# Patient Record
Sex: MALE | Race: WHITE
[De-identification: names, ages, dates, MRNs, and addresses within clinical notes are randomized per-mention and may not be internally consistent; named-entity substitution may affect disease eponyms.]

---

## 2017-12-01 ENCOUNTER — HOSPITAL ENCOUNTER (OUTPATIENT)
Dept: HOSPITAL 47 - RADUSWWP | Age: 67
Discharge: HOME | End: 2017-12-01
Payer: MEDICARE

## 2017-12-01 DIAGNOSIS — Z13.9: Primary | ICD-10-CM

## 2017-12-01 PROCEDURE — 93979 VASCULAR STUDY: CPT

## 2017-12-01 NOTE — US
EXAMINATION TYPE: US duplex aorta

 

DATE OF EXAM: 12/1/2017

 

COMPARISON: NONE

 

CLINICAL HISTORY: Z13.9 abdominal aortic anuerysm screening.

 

EXAM MEASUREMENTS:

 

Abdominal Aorta:

** Proximal:  2.4 x 2.1cm

** Mid:  1.9 x 2.1cm

** Distal:  1.6 x 2.0cm

** Right Iliac:  1.1 x 1.3cm 

** Left Iliac:  1.3 x 1.3cm

 

Limited visualization due to patient body habitus and overlying midline bowel gas.

 

 

 

IMPRESSION: No evidence for abdominal aortic aneurysm.

## 2019-06-13 ENCOUNTER — HOSPITAL ENCOUNTER (OUTPATIENT)
Dept: HOSPITAL 47 - ORWHC2ENDO | Age: 69
Discharge: HOME | End: 2019-06-13
Payer: MEDICARE

## 2019-06-13 VITALS — HEART RATE: 76 BPM | SYSTOLIC BLOOD PRESSURE: 108 MMHG | DIASTOLIC BLOOD PRESSURE: 76 MMHG

## 2019-06-13 VITALS — BODY MASS INDEX: 32.5 KG/M2

## 2019-06-13 VITALS — TEMPERATURE: 97.4 F

## 2019-06-13 VITALS — RESPIRATION RATE: 16 BRPM

## 2019-06-13 DIAGNOSIS — Z89.022: ICD-10-CM

## 2019-06-13 DIAGNOSIS — F17.200: ICD-10-CM

## 2019-06-13 DIAGNOSIS — K64.4: ICD-10-CM

## 2019-06-13 DIAGNOSIS — K52.9: ICD-10-CM

## 2019-06-13 DIAGNOSIS — Z91.040: ICD-10-CM

## 2019-06-13 DIAGNOSIS — Z79.899: ICD-10-CM

## 2019-06-13 DIAGNOSIS — Z79.82: ICD-10-CM

## 2019-06-13 DIAGNOSIS — D12.5: Primary | ICD-10-CM

## 2019-06-13 DIAGNOSIS — K64.8: ICD-10-CM

## 2019-06-13 PROCEDURE — 45380 COLONOSCOPY AND BIOPSY: CPT

## 2019-06-13 PROCEDURE — 88305 TISSUE EXAM BY PATHOLOGIST: CPT

## 2019-06-13 NOTE — P.PCN
Date of Procedure: 06/13/19


Description of Procedure: 





BRIEF HISTORY: 


Patient is a 68-year-old pleasant female patient who was seen in the office 

reporting chronic diarrhea since 01/07/2019.  His symptoms started suddenly last

bowel movement today.  He reported extreme urgency and fecal incontinence with 

the episode.  No nocturnal bowel movements or blood per rectum.  There does not 

seem to be diet related.  He reports losing 35 pounds in 1-1/2 years.  Recently 

he has gained back 15 pounds.  No prior colonoscopy.  Stool studies were 

unremarkable.  





PROCEDURE PERFORMED: 


Colonoscopy with polypectomy and random biopsy. 





PREOPERATIVE DIAGNOSIS: 


Change in bowel habits, diarrhea, no prior colonoscopy. 





ESTIMATED BLOOD LOSS: 


Minimal.





IV sedation per Anesthesia. 





PROCEDURE: 


After informed consent was obtained, the patient, was brought into the endoscopy

unit. IV sedation was administered by Anesthesia under continuous monitoring.  

Digital rectal examination was normal. Initially the Olympus CF-190 flexible 

video colonoscope was then inserted in the rectum, gradually advanced into the 

cecum without any difficulty.  The terminal ileum was intubated and appeared 

normal.  Careful examination was performed as the scope was gradually being 

withdrawn. Ileocecal valve and the appendiceal orifice were visualized and 

appeared normal.  Prep was excellent. Mucosa of the cecum, ascending colon, 

transverse colon, descending colon, sigmoid colon, and rectum appeared normal, 

with random biopsies taken of the right colon, transverse colon and left colon. 

Moderate internal hemorrhoids with skin tags noted.. Retroflexion was performed 

in the rectum and no lesions were seen. The patient tolerated the procedure 

well. 





IMPRESSION: 


Normal-appearing colon from rectum to cecum with random biopsies of the right 

colon, transverse colon and left colon.


Normal-appearing terminal ileum.


Moderate internal hemorrhoids.





RECOMMENDATIONS:  


Findings of this examination were discussed with the patient.  Okay to resume 

diet.  Await pathology from biopsies.  Follow up with gastroenterology as 

previously scheduled.  Continue Lomotil as needed for diarrhea.

## 2019-12-20 ENCOUNTER — HOSPITAL ENCOUNTER (OUTPATIENT)
Dept: HOSPITAL 47 - RADPROMAIN | Age: 69
Discharge: HOME | End: 2019-12-20
Attending: OTOLARYNGOLOGY
Payer: MEDICARE

## 2019-12-20 VITALS — TEMPERATURE: 98 F | RESPIRATION RATE: 20 BRPM

## 2019-12-20 VITALS — HEART RATE: 74 BPM | DIASTOLIC BLOOD PRESSURE: 80 MMHG | SYSTOLIC BLOOD PRESSURE: 142 MMHG

## 2019-12-20 DIAGNOSIS — R59.0: Primary | ICD-10-CM

## 2019-12-20 DIAGNOSIS — D11.9: ICD-10-CM

## 2019-12-20 PROCEDURE — 88305 TISSUE EXAM BY PATHOLOGIST: CPT

## 2019-12-20 PROCEDURE — 38505 NEEDLE BIOPSY LYMPH NODES: CPT

## 2019-12-20 PROCEDURE — 21550 BIOPSY OF NECK/CHEST: CPT

## 2019-12-20 NOTE — US
EXAMINATION TYPE: US biopsy thorax or neck

 

DATE OF EXAM: 12/20/2019

 

HISTORY: Left neck adenopathy.

 

FINDINGS: Maximal barrier technique was utilized.  The skin overlying a suitable path to the patient'
s left neck enlarged node was localized with ultrasound and the overlying skin prepped and draped.  U
ltrasound was utilized with sterile technique. Lidocaine was  used for local anesthesia.  A skin nick
 was made with a scalpel.  An 18-gauge needle was advanced under direct ultrasound guidance and core 
specimen obtained of the mass, second core obtained.  Specimen submitted in saline to Pathology.  Fol
lowing the procedure, hemostasis achieved and the patient is discharged in stable condition without c
omplication.

 

IMPRESSION:STATUS POST ULTRASOUND GUIDED CORE BIOPSY OF left neck adenopathy, PATHOLOGY IS PENDING.  
THIS PROCEDURE IS PERFORMED BY THE UNDERSIGNED.

## 2019-12-27 ENCOUNTER — HOSPITAL ENCOUNTER (OUTPATIENT)
Dept: HOSPITAL 47 - RADCTMAIN | Age: 69
Discharge: HOME | End: 2019-12-27
Attending: OTOLARYNGOLOGY
Payer: MEDICARE

## 2019-12-27 DIAGNOSIS — R22.1: Primary | ICD-10-CM

## 2019-12-27 LAB — BUN SERPL-SCNC: 25 MG/DL (ref 9–20)

## 2019-12-27 PROCEDURE — 82565 ASSAY OF CREATININE: CPT

## 2019-12-27 PROCEDURE — 36415 COLL VENOUS BLD VENIPUNCTURE: CPT

## 2019-12-27 PROCEDURE — 70491 CT SOFT TISSUE NECK W/DYE: CPT

## 2019-12-27 PROCEDURE — 84520 ASSAY OF UREA NITROGEN: CPT

## 2019-12-27 NOTE — CT
EXAMINATION TYPE: CT soft tissue neck w con

 

DATE OF EXAM: 12/27/2019

 

COMPARISON: None

 

HISTORY: Lump left side neck

 

CT DLP: 747.7 mGycm

 

CONTRAST: 

CT scan of the neck is performed with IV Contrast, patient injected with 80 mL of Isovue 300.

 

Contrast enhanced CT of the neck was performed from the skull base through the lung apices.

 

AIRWAY:   The supraglottic, glottic, and subglottic portions of the airway appear patent and free of 
mass.

 

SALIVARY GLANDS:  The submandibular and parotid glands are free of mass or inflammatory process.

 

THYROID GLAND:  No nodules or masses seen.

 

LYMPH NODES: There is a solid left neck mass resides adjacent to the lower pole of the left parotid g
land which measures 3.8 x 2.2 x 2.7 cm. The mass appears to be separate from the parotid gland althou
gh it doesn't impart mass effect upon its lower pole. No additional solid soft tissue mass is seen.

 

LUNG APICES:  No nodule or mass is seen.

 

OTHER:  Vascular structures are patent.  No significant degenerative change of the cervical spine.  N
o abscess seen.

 

IMPRESSION:

Solid left neck mass residing adjacent to the lower pole of the left parotid gland as discussed above
. Tissue diagnosis has been performed and correlated with pathologic results. No additional adenopath
y or mass is identified at this time.

## 2020-01-22 ENCOUNTER — HOSPITAL ENCOUNTER (OUTPATIENT)
Dept: HOSPITAL 47 - OR | Age: 70
Discharge: HOME | End: 2020-01-22
Attending: OTOLARYNGOLOGY
Payer: MEDICARE

## 2020-01-22 VITALS — RESPIRATION RATE: 16 BRPM

## 2020-01-22 VITALS — TEMPERATURE: 97.2 F

## 2020-01-22 VITALS — SYSTOLIC BLOOD PRESSURE: 118 MMHG | HEART RATE: 71 BPM | DIASTOLIC BLOOD PRESSURE: 72 MMHG

## 2020-01-22 VITALS — BODY MASS INDEX: 30.4 KG/M2

## 2020-01-22 DIAGNOSIS — Z79.899: ICD-10-CM

## 2020-01-22 DIAGNOSIS — Z98.890: ICD-10-CM

## 2020-01-22 DIAGNOSIS — K21.9: ICD-10-CM

## 2020-01-22 DIAGNOSIS — Z91.040: ICD-10-CM

## 2020-01-22 DIAGNOSIS — F17.210: ICD-10-CM

## 2020-01-22 DIAGNOSIS — Z89.9: ICD-10-CM

## 2020-01-22 DIAGNOSIS — Z79.82: ICD-10-CM

## 2020-01-22 DIAGNOSIS — K58.9: ICD-10-CM

## 2020-01-22 DIAGNOSIS — Z82.61: ICD-10-CM

## 2020-01-22 DIAGNOSIS — D11.0: Primary | ICD-10-CM

## 2020-01-22 DIAGNOSIS — I10: ICD-10-CM

## 2020-01-22 PROCEDURE — 42415 EXCISE PAROTID GLAND/LESION: CPT

## 2020-01-22 PROCEDURE — 88307 TISSUE EXAM BY PATHOLOGIST: CPT

## 2020-01-22 NOTE — P.OP
Date of Procedure: 01/22/20


Preoperative Diagnosis: 


Left parotid neoplasm


Postoperative Diagnosis: 


Same


Procedure(s) Performed: 


Left superficial parotidectomy with facial nerve dissection and preservation


EMG facial nerve monitoring


Anesthesia: TAMYA


Surgeon: Raimundo Domínguez


Estimated Blood Loss (ml): 5


Pathology: other (Left parotid neoplasm)


Condition: stable


Disposition: PACU


Indications for Procedure: 


This 69-year-old white male who developed a left neck mass several months ago.  

This is the upper neck and has had computed tomography scan which showed this 

arising from the inferior aspect of the left parotid gland.  Needle biopsy was 

consistent with a Warthin's tumor.


Operative Findings: 


Approximate 3.5 x 4 cm left inferior parotid mass which was well circumscribed 

and appeared partially cystic.  This arose from the inferior aspect of the left 

parotid gland.  Marginal mandibular branch of facial nerve was identified and 

preserved as well as the greater auricular nerve.  This was excised grossly 

entirely.


Description of Procedure: 


Patient brought In the supine position.  Patient underwent induction of general 

anesthesia with oral endotracheal intubation without difficulty.  The patient 

was prepped and draped in usual aseptic fashion.  Facial nerve monitor was 

placed which was the NIM II and was tested manually and working well.  1% 

lidocaine with 100,000 epinephrine was infused subcutaneously along the 

transverse incision site left neck.  A transverse incision was made to half 

fingerbreadths below the angle of mandible on the left upper neck within a 

relaxed skin tension line.  This is carried sharply through skin and subcutaneo

us tissue and platysma layer.  Subplatysmal flaps were raised superiorly and 

inferiorly.  The mass itself was identified anterior to sternocleidomastoid 

muscle and was adjacent to the greater auricular nerve which was dissected free 

from the mass and left intact.  The dissection continued at the superior aspect 

of the lesion down to the marginal mandibular branch of facial nerve which was 

identified and preserved and the mass was dissected from this from superior to 

inferior.  Mass was dissected off of the inferior aspect of the parotid gland 

small cuff of normal parotid tissue attached and was then dissected from the 

surrounding tissue otherwise cleaned off of the sternocleidomastoid muscle 

inferiorly.  Once the mass was excised hemostasis was gained with the 

Hemostatix.  Throughout the case larger vessels were doubly clamped divided and 

tied with 3-0 Vicryl ties.  On completion of the removal of this lesion the 

marginal mandibular branch of the facial nerve was noted to be grossly intact 

and stimulated well at 0.5 mA.  The there was good hemostasis noted and copious 

irrigation with sterile normal saline was performed.  A small 10-Slov[place] round 

drain was placed through a separate stab incision and sutured to the skin with a

2-0 silk suture.  The wound was then closed in the platysma and subcutaneous 

layers with inverted interrupted 4-0 Vicryl suture and skin closed with running 

locking 5-0 Prolene suture.  Bacitracin ointment sterile dressing were placed as

well as drain dressing.  The drain is working well with minimal output 

depression 1 mL.  Patient tolerated procedure well and was extubated in the 

operating suite and transferred to postop recovery area in satisfactory 

condition.

## 2025-04-29 ENCOUNTER — HOSPITAL ENCOUNTER (OUTPATIENT)
Dept: HOSPITAL 47 - EC | Age: 75
Setting detail: OBSERVATION
LOS: 1 days | Discharge: HOME | End: 2025-04-30
Attending: INTERNAL MEDICINE | Admitting: INTERNAL MEDICINE
Payer: MEDICARE

## 2025-04-29 VITALS — BODY MASS INDEX: 30.1 KG/M2

## 2025-04-29 DIAGNOSIS — I12.9: ICD-10-CM

## 2025-04-29 DIAGNOSIS — D72.829: ICD-10-CM

## 2025-04-29 DIAGNOSIS — E66.9: ICD-10-CM

## 2025-04-29 DIAGNOSIS — R00.1: ICD-10-CM

## 2025-04-29 DIAGNOSIS — N40.0: ICD-10-CM

## 2025-04-29 DIAGNOSIS — M48.00: ICD-10-CM

## 2025-04-29 DIAGNOSIS — N18.30: ICD-10-CM

## 2025-04-29 DIAGNOSIS — Z98.890: ICD-10-CM

## 2025-04-29 DIAGNOSIS — Z79.899: ICD-10-CM

## 2025-04-29 DIAGNOSIS — Z79.82: ICD-10-CM

## 2025-04-29 DIAGNOSIS — E87.20: ICD-10-CM

## 2025-04-29 DIAGNOSIS — I48.91: Primary | ICD-10-CM

## 2025-04-29 DIAGNOSIS — Z91.040: ICD-10-CM

## 2025-04-29 DIAGNOSIS — Z86.018: ICD-10-CM

## 2025-04-29 DIAGNOSIS — I21.A1: ICD-10-CM

## 2025-04-29 DIAGNOSIS — I07.1: ICD-10-CM

## 2025-04-29 LAB
ALBUMIN SERPL-MCNC: 3.5 G/DL (ref 3.5–5)
ALP SERPL-CCNC: 94 U/L (ref 38–126)
ALT SERPL-CCNC: 12 U/L (ref 4–49)
ANION GAP SERPL CALC-SCNC: 7 MMOL/L
APTT BLD: 24.1 SEC (ref 22–30)
AST SERPL-CCNC: 14 U/L (ref 17–59)
BASOPHILS # BLD AUTO: 0.06 10*3/UL (ref 0–0.1)
BASOPHILS NFR BLD AUTO: 0.6 %
BUN SERPL-SCNC: 36 MG/DL (ref 9–20)
CHLORIDE SERPL-SCNC: 104 MMOL/L (ref 98–107)
CO2 SERPL-SCNC: 22 MMOL/L (ref 22–30)
EOSINOPHIL # BLD AUTO: 0.06 10*3/UL (ref 0.04–0.35)
EOSINOPHIL NFR BLD AUTO: 0.6 %
ERYTHROCYTE [DISTWIDTH] IN BLOOD BY AUTOMATED COUNT: 5.03 10*6/UL (ref 4.4–5.6)
GLUCOSE SERPL-MCNC: 122 MG/DL (ref 74–99)
HGB BLD-MCNC: 16.7 G/DL (ref 13–17)
LYMPHOCYTES # SPEC AUTO: 1.39 10*3/UL (ref 0.9–5)
LYMPHOCYTES NFR SPEC AUTO: 14.9 %
MAGNESIUM SPEC-SCNC: 1.7 MG/DL (ref 1.6–2.3)
MCH RBC QN AUTO: 33.2 PG (ref 27–32)
MCHC RBC AUTO-ENTMCNC: 35.5 G/DL (ref 32–37)
MCV RBC AUTO: 93.4 FL (ref 80–97)
MONOCYTES # BLD AUTO: 0.86 10*3/UL (ref 0.2–1)
MONOCYTES NFR BLD AUTO: 9.2 %
NEUTROPHILS # BLD AUTO: 6.96 10*3/UL (ref 1.8–7.7)
NEUTROPHILS NFR BLD AUTO: 74.5 %
NT-PROBNP SERPL-MCNC: 246 PG/ML
PLATELET # BLD AUTO: 257 10*3/UL (ref 140–440)
PROT SERPL-MCNC: 5.9 G/DL (ref 6.3–8.2)
PT BLD: 11.3 SEC (ref 10–12.5)
SODIUM SERPL-SCNC: 133 MMOL/L (ref 137–145)
WBC # BLD AUTO: 9.35 10*3/UL (ref 4.5–10)

## 2025-04-29 PROCEDURE — 96368 THER/DIAG CONCURRENT INF: CPT

## 2025-04-29 PROCEDURE — 93005 ELECTROCARDIOGRAM TRACING: CPT

## 2025-04-29 PROCEDURE — 93306 TTE W/DOPPLER COMPLETE: CPT

## 2025-04-29 PROCEDURE — 85730 THROMBOPLASTIN TIME PARTIAL: CPT

## 2025-04-29 PROCEDURE — 96376 TX/PRO/DX INJ SAME DRUG ADON: CPT

## 2025-04-29 PROCEDURE — 36415 COLL VENOUS BLD VENIPUNCTURE: CPT

## 2025-04-29 PROCEDURE — 83735 ASSAY OF MAGNESIUM: CPT

## 2025-04-29 PROCEDURE — 84443 ASSAY THYROID STIM HORMONE: CPT

## 2025-04-29 PROCEDURE — 96365 THER/PROPH/DIAG IV INF INIT: CPT

## 2025-04-29 PROCEDURE — 96366 THER/PROPH/DIAG IV INF ADDON: CPT

## 2025-04-29 PROCEDURE — 83605 ASSAY OF LACTIC ACID: CPT

## 2025-04-29 PROCEDURE — 85379 FIBRIN DEGRADATION QUANT: CPT

## 2025-04-29 PROCEDURE — 80053 COMPREHEN METABOLIC PANEL: CPT

## 2025-04-29 PROCEDURE — 99291 CRITICAL CARE FIRST HOUR: CPT

## 2025-04-29 PROCEDURE — 84100 ASSAY OF PHOSPHORUS: CPT

## 2025-04-29 PROCEDURE — 83880 ASSAY OF NATRIURETIC PEPTIDE: CPT

## 2025-04-29 PROCEDURE — 84484 ASSAY OF TROPONIN QUANT: CPT

## 2025-04-29 PROCEDURE — 85025 COMPLETE CBC W/AUTO DIFF WBC: CPT

## 2025-04-29 PROCEDURE — 85610 PROTHROMBIN TIME: CPT

## 2025-04-29 RX ADMIN — CEFAZOLIN ONE MLS/HR: 330 INJECTION, POWDER, FOR SOLUTION INTRAMUSCULAR; INTRAVENOUS at 17:39

## 2025-04-29 RX ADMIN — DILTIAZEM HYDROCHLORIDE STA MG: 5 INJECTION INTRAVENOUS at 17:48

## 2025-04-29 RX ADMIN — HEPARIN SODIUM ONE UNIT: 1000 INJECTION, SOLUTION INTRAVENOUS; SUBCUTANEOUS at 18:49

## 2025-04-29 RX ADMIN — DILTIAZEM HYDROCHLORIDE SCH MLS/HR: 5 INJECTION INTRAVENOUS at 17:39

## 2025-04-29 RX ADMIN — HEPARIN SODIUM SCH MLS/HR: 10000 INJECTION, SOLUTION INTRAVENOUS at 18:45

## 2025-04-29 RX ADMIN — CEFAZOLIN SCH MLS/HR: 330 INJECTION, POWDER, FOR SOLUTION INTRAMUSCULAR; INTRAVENOUS at 18:51

## 2025-04-30 VITALS — SYSTOLIC BLOOD PRESSURE: 126 MMHG | DIASTOLIC BLOOD PRESSURE: 78 MMHG | TEMPERATURE: 97.8 F | HEART RATE: 63 BPM

## 2025-04-30 VITALS — RESPIRATION RATE: 16 BRPM

## 2025-04-30 LAB
ALBUMIN SERPL-MCNC: 4.4 G/DL (ref 3.5–5)
ALP SERPL-CCNC: 127 U/L (ref 38–126)
ALT SERPL-CCNC: 15 U/L (ref 4–49)
ANION GAP SERPL CALC-SCNC: 15 MMOL/L
AST SERPL-CCNC: 19 U/L (ref 17–59)
BASOPHILS # BLD AUTO: 0.07 10*3/UL (ref 0–0.1)
BASOPHILS NFR BLD AUTO: 0.6 %
BUN SERPL-SCNC: 32 MG/DL (ref 9–20)
CALCIUM SPEC-MCNC: 9.8 MG/DL (ref 8.4–10.2)
CHLORIDE SERPL-SCNC: 100 MMOL/L (ref 98–107)
CO2 SERPL-SCNC: 22 MMOL/L (ref 22–30)
EOSINOPHIL # BLD AUTO: 0.03 10*3/UL (ref 0.04–0.35)
EOSINOPHIL NFR BLD AUTO: 0.2 %
ERYTHROCYTE [DISTWIDTH] IN BLOOD BY AUTOMATED COUNT: 5.52 10*6/UL (ref 4.4–5.6)
ERYTHROCYTE [DISTWIDTH] IN BLOOD: 12.9 % (ref 11.5–14.5)
GLUCOSE SERPL-MCNC: 120 MG/DL (ref 74–99)
HCT VFR BLD AUTO: 53.5 % (ref 39.6–50)
HGB BLD-MCNC: 18.3 G/DL (ref 13–17)
LYMPHOCYTES # SPEC AUTO: 1.79 10*3/UL (ref 0.9–5)
LYMPHOCYTES NFR SPEC AUTO: 14.2 %
MAGNESIUM SPEC-SCNC: 1.9 MG/DL (ref 1.6–2.3)
MCH RBC QN AUTO: 33.2 PG (ref 27–32)
MCHC RBC AUTO-ENTMCNC: 34.2 G/DL (ref 32–37)
MCV RBC AUTO: 96.9 FL (ref 80–97)
MONOCYTES NFR BLD AUTO: 7.1 %
NEUTROPHILS # BLD AUTO: 9.83 10*3/UL (ref 1.8–7.7)
NEUTROPHILS NFR BLD AUTO: 77.7 %
PLATELET # BLD AUTO: 308 10*3/UL (ref 140–440)
POTASSIUM SERPL-SCNC: 3.8 MMOL/L (ref 3.5–5.1)
SODIUM SERPL-SCNC: 137 MMOL/L (ref 137–145)
WBC # BLD AUTO: 12.65 10*3/UL (ref 4.5–10)

## 2025-04-30 RX ADMIN — METOPROLOL TARTRATE SCH MG: 25 TABLET, FILM COATED ORAL at 08:50

## 2025-04-30 RX ADMIN — ASPIRIN 81 MG CHEWABLE TABLET SCH MG: 81 TABLET CHEWABLE at 08:50

## 2025-04-30 RX ADMIN — HEPARIN SODIUM PRN UNIT: 1000 INJECTION, SOLUTION INTRAVENOUS; SUBCUTANEOUS at 04:00

## 2025-04-30 RX ADMIN — APIXABAN SCH MG: 5 TABLET, FILM COATED ORAL at 09:07

## 2025-04-30 RX ADMIN — TAMSULOSIN HYDROCHLORIDE SCH MG: 0.4 CAPSULE ORAL at 08:50
